# Patient Record
Sex: MALE | Race: WHITE
[De-identification: names, ages, dates, MRNs, and addresses within clinical notes are randomized per-mention and may not be internally consistent; named-entity substitution may affect disease eponyms.]

---

## 2018-01-29 ENCOUNTER — HOSPITAL ENCOUNTER (EMERGENCY)
Dept: HOSPITAL 31 - C.ER | Age: 48
Discharge: HOME | End: 2018-01-29
Payer: MEDICAID

## 2018-01-29 VITALS — TEMPERATURE: 98.5 F | OXYGEN SATURATION: 98 %

## 2018-01-29 VITALS — SYSTOLIC BLOOD PRESSURE: 139 MMHG | DIASTOLIC BLOOD PRESSURE: 73 MMHG | HEART RATE: 71 BPM | RESPIRATION RATE: 16 BRPM

## 2018-01-29 VITALS — BODY MASS INDEX: 34.8 KG/M2

## 2018-01-29 DIAGNOSIS — E11.9: ICD-10-CM

## 2018-01-29 DIAGNOSIS — E78.00: ICD-10-CM

## 2018-01-29 DIAGNOSIS — K64.9: Primary | ICD-10-CM

## 2018-01-29 PROCEDURE — 74022 RADEX COMPL AQT ABD SERIES: CPT

## 2018-01-29 PROCEDURE — 99284 EMERGENCY DEPT VISIT MOD MDM: CPT

## 2018-01-29 NOTE — RAD
PROCEDURE:  Radiographs of the chest and abdomen (obstructive series) 

0 0 



HISTORY:

pain  



COMPARISON:

No prior.



TECHNIQUE:

AP radiograph of the chest, with upright and supine radiographs of 

the abdomen.



FINDINGS:



CHEST:

Lungs: Clear.



Cardiovascular: Normal size heart. No pulmonary vascular congestion.



Pleura: No pleural fluid. No pneumothorax.



Other findings: None.



ABDOMEN AND PELVIS:

Bowel: Mild retained feces. No evidence of bowel obstruction.



Free air: None.



Bones:  Unremarkable.



Other findings: None.



IMPRESSION:

No evidence of bowel obstruction.

## 2018-01-29 NOTE — C.PDOC
History Of Present Illness


46 y/o male with h/o hemorrhoids presents to ED with complaints of rectal pain 

when having bowel movement. Pt notes that he feels something there. He had some 

rectal bleeding a week ago which has improved. He has seen his PMD who 

prescribed antibiotics and topical creams but pain persists. Notes his stools 

area hard and he strains with BM. Last BM yesterday. Patient denies fever, 

chills, abdominal pain, n/v or any other complaints at this time. Pt has 

specialist appt on Thursday.  


Time Seen by Provider: 01/29/18 09:18


Chief Complaint (Nursing): GI Problem


History Per: Patient


History/Exam Limitations: no limitations


Onset/Duration Of Symptoms: Days


Current Symptoms Are (Timing): Still Present





Past Medical History


Reviewed: Historical Data, Nursing Documentation, Vital Signs


Vital Signs: 


 Last Vital Signs











Temp  98.5 F   01/29/18 08:50


 


Pulse  71   01/29/18 11:08


 


Resp  16   01/29/18 11:08


 


BP  139/73   01/29/18 11:08


 


Pulse Ox  98   01/29/18 11:08














- Medical History


PMH: Diabetes, Hypercholesterolemia


Surgical History: No Surg Hx


Family History: States: No Known Family Hx





- Social History


Hx Tobacco Use: No


Hx Alcohol Use: No


Hx Substance Use: No





- Immunization History


Hx Tetanus Toxoid Vaccination: No


Hx Influenza Vaccination: Yes (2017)


Hx Pneumococcal Vaccination: No





Review Of Systems


Constitutional: Negative for: Fever, Chills


Gastrointestinal: Positive for: Constipation, Hematochezia.  Negative for: 

Vomiting, Abdominal Pain


Musculoskeletal: Negative for: Back Pain


Skin: Negative for: Rash





Physical Exam





- Physical Exam


Appears: Non-toxic, No Acute Distress


Skin: Warm, Dry, No Rash


Head: Atraumatic, Normacephalic


Eye(s): bilateral: Normal Inspection, EOMI


Nose: Normal


Oral Mucosa: Moist


Neck: Normal ROM, Supple


Chest: Symmetrical


Cardiovascular: Rhythm Regular


Respiratory: Normal Breath Sounds, No Accessory Muscle Use, No Rales, No Rhonchi

, No Wheezing


Gastrointestinal/Abdominal: Soft, No Tenderness, Distention, No Guarding, No 

Rebound


Rectal: Rectal Tone, No Maroon Stool, No Melena, No Blood Streaked Stool, 

Hemorrhoids (non thrombosed pink hemorrhoid), Tenderness


Extremity: Normal ROM, Capillary Refill (<2 seconds)


Neurological/Psych: Oriented x3, Normal Speech, Normal Cognition





ED Course And Treatment


O2 Sat by Pulse Oximetry: 98 (RA)


Pulse Ox Interpretation: Normal





- Other Rad


  ** Obstructive series abdomen


X-Ray: Viewed By Me, Read By Radiologist


Interpretation: PROCEDURE:  Radiographs of the chest and abdomen (obstructive 

series) 0 0.  HISTORY:  pain.  COMPARISON:  No prior.  TECHNIQUE:  AP 

radiograph of the chest, with upright and supine radiographs of the abdomen.  

FINDINGS:  CHEST:  Lungs: Clear.  Cardiovascular: Normal size heart. No 

pulmonary vascular congestion.  Pleura: No pleural fluid. No pneumothorax.  

Other findings: None.  ABDOMEN AND PELVIS:  Bowel: Mild retained feces. No 

evidence of bowel obstruction.  Free air: None.  Bones:  Unremarkable.  Other 

findings: None.  IMPRESSION:  No evidence of bowel obstruction.


Progress Note: Xray Obstructive series ordered, Blood culture sent to lab.  

Discussed diet changes, prevention, and specialist f/u. Patient has no active 

rectal bleeding, is not orthostatic, and vital signs are stable. Patient was 

instructed to follow up with their physician/clinic in 1-2 days





Disposition





- Disposition


Referrals: 


Jorge Alberto Lin [Staff Provider] - 


Disposition: HOME/ ROUTINE


Disposition Time: 10:39


Condition: STABLE


Additional Instructions: 


Warm sitz baths. Follow up with primary medical doctor in 1-3 days without fail 

for further evaluation. Take medications as prescribed. Return to the emergency 

department at any time if symptoms persist or worsen.








Prescriptions: 


Docusate [Colace] 100 mg PO BID #14 cap


Hydrocortisone-Pramoxine 1%-1% [Proctofoam] 1 applic TOP TID #15 aer


Instructions:  Hemorrhoids (ED)


Forms:  [facility] (English)





- Clinical Impression


Clinical Impression: 


 Hemorrhoids








- PA / NP / Resident Statement


MD/DO has reviewed & agrees with the documentation as recorded.





- Scribe Statement


The provider has reviewed the documentation as recorded by the Buck Kirby





All medical record entries made by the Buck were at my direction and 

personally dictated by me. I have reviewed the chart and agree that the record 

accurately reflects my personal performance of the history, physical exam, 

medical decision making, and the department course for this patient. I have 

also personally directed, reviewed, and agree with the discharge instructions 

and disposition.